# Patient Record
Sex: MALE | Race: WHITE | Employment: UNEMPLOYED | URBAN - METROPOLITAN AREA
[De-identification: names, ages, dates, MRNs, and addresses within clinical notes are randomized per-mention and may not be internally consistent; named-entity substitution may affect disease eponyms.]

---

## 2020-10-17 ENCOUNTER — HOSPITAL ENCOUNTER (EMERGENCY)
Age: 20
Discharge: HOME | End: 2020-10-18
Attending: EMERGENCY MEDICINE

## 2020-10-17 VITALS
WEIGHT: 220 LBS | HEIGHT: 74 IN | HEART RATE: 80 BPM | TEMPERATURE: 98 F | BODY MASS INDEX: 28.23 KG/M2 | SYSTOLIC BLOOD PRESSURE: 144 MMHG | RESPIRATION RATE: 20 BRPM | OXYGEN SATURATION: 99 % | DIASTOLIC BLOOD PRESSURE: 61 MMHG

## 2020-10-17 DIAGNOSIS — S20.212A CONTUSION OF LEFT CHEST WALL, INITIAL ENCOUNTER: Primary | ICD-10-CM

## 2020-10-17 PROCEDURE — 99283 EMERGENCY DEPT VISIT LOW MDM: CPT

## 2020-10-18 ENCOUNTER — APPOINTMENT (OUTPATIENT)
Dept: GENERAL RADIOLOGY | Age: 20
End: 2020-10-18
Attending: EMERGENCY MEDICINE

## 2020-10-18 PROCEDURE — 71101 X-RAY EXAM UNILAT RIBS/CHEST: CPT | Performed by: EMERGENCY MEDICINE
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
## 2025-01-14 ENCOUNTER — HOSPITAL ENCOUNTER (EMERGENCY)
Age: 25
Discharge: HOME | End: 2025-01-14
Attending: EMERGENCY MEDICINE

## 2025-01-14 VITALS
WEIGHT: 206 LBS | BODY MASS INDEX: 27.3 KG/M2 | HEIGHT: 73 IN | SYSTOLIC BLOOD PRESSURE: 149 MMHG | HEART RATE: 72 BPM | DIASTOLIC BLOOD PRESSURE: 93 MMHG | TEMPERATURE: 98 F | RESPIRATION RATE: 16 BRPM

## 2025-01-14 DIAGNOSIS — K02.9 PAIN DUE TO DENTAL CARIES: ICD-10-CM

## 2025-01-14 DIAGNOSIS — K04.7 DENTAL INFECTION: Primary | ICD-10-CM

## 2025-01-14 PROCEDURE — 99284 EMERGENCY DEPT VISIT MOD MDM: CPT

## 2025-01-14 PROCEDURE — 99283 EMERGENCY DEPT VISIT LOW MDM: CPT

## 2025-01-14 RX ORDER — IBUPROFEN 600 MG/1
600 TABLET, FILM COATED ORAL EVERY 8 HOURS PRN
Qty: 30 TABLET | Refills: 0 | Status: SHIPPED | OUTPATIENT
Start: 2025-01-14 | End: 2025-01-21

## 2025-01-14 RX ORDER — AMOXICILLIN 875 MG/1
875 TABLET, COATED ORAL 2 TIMES DAILY
Qty: 20 TABLET | Refills: 0 | Status: SHIPPED | OUTPATIENT
Start: 2025-01-14 | End: 2025-01-24

## 2025-01-14 NOTE — DISCHARGE INSTRUCTIONS
Likely dental cavity/infection with impacted wisdom tooth.  Antibiotics as prescribed.  Ibuprofen 600 mg every 6-8 hours with food.  Can take 2 extra Tylenol every 6-8 hours with food as well.  Very important to see his dentist as soon as you can.  Return to facial swelling or new complaints.

## 2025-01-14 NOTE — ED PROVIDER NOTES
Patient Seen in: [facility] In [place]      History     Chief Complaint   Patient presents with    Dental Problem     Stated Complaint: dental pain    Subjective:   HPI      Patient is a 24-year-old gentleman presents with left mandibular molar pain.  Has been off and on for quite some time.  More painful today.  Says he is both afraid and does not have money to see a dentist.  No facial swelling.  No difficulty swallowing.  No trismus or meningismus.  No other specific complaints.    Objective:     History reviewed. No pertinent past medical history.           History reviewed. No pertinent surgical history.             Social History     Socioeconomic History    Marital status: Single   Tobacco Use    Smoking status: Never     Passive exposure: Never    Smokeless tobacco: Never   Vaping Use    Vaping status: Never Used   Substance and Sexual Activity    Alcohol use: Yes     Comment: occ    Drug use: Yes     Types: Cannabis                  Physical Exam     ED Triage Vitals [01/14/25 0845]   BP (!) 149/93   Pulse 72   Resp 16   Temp 98.1 °F (36.7 °C)   Temp src Oral   SpO2    O2 Device        Current Vitals:   Vital Signs  BP: (!) 149/93  Pulse: 72  Resp: 16  Temp: 98.1 °F (36.7 °C)  Temp src: Oral        Physical Exam  General: Well-appearing patient of stated age resting comfortably  HEENT: Normocephalic atraumatic.  Nonicteric sclera.  Moist mucous membranes.  Left posterior mandible molar appears to have a cavity.  Also impacted.  Mild gum swelling.  Lungs: No tachypnea  Cardiac: No tachycardia  Skin: No rashes, pallor  Neuro: No focal deficits.  Extremities:    ED Course   Labs Reviewed - No data to display                MDM      Dental pain with likely cavity.  Also impacted wisdom tooth.  Needs to follow-up with a dentist as soon as he can.  Will treat with amoxicillin for 10 days.  Tylenol/ibuprofen.  Return if facial swelling, new complaints.        Medical Decision  Making      Disposition and Plan     Clinical Impression:  1. Dental infection    2. Pain due to dental caries         Disposition:  Discharge  1/14/2025  8:50 am    Follow-up:  dentist    Follow up in 2 day(s)            Medications Prescribed:  Current Discharge Medication List        START taking these medications    Details   amoxicillin 875 MG Oral Tab Take 1 tablet (875 mg total) by mouth 2 (two) times daily for 10 days.  Qty: 20 tablet, Refills: 0      ibuprofen 600 MG Oral Tab Take 1 tablet (600 mg total) by mouth every 8 (eight) hours as needed for Pain or Fever.  Qty: 30 tablet, Refills: 0                 Supplementary Documentation:

## 2025-04-05 NOTE — ED PROVIDER NOTES
Patient Seen in: [facility] In [place]      History     Chief Complaint   Patient presents with    Dental Problem     Stated Complaint: dental pain    Subjective:   25 yo-year-old male presents to the emergency department with complaint of dental pain.  Patient states he has been having left lower dental pain for a few months.  He was seen here in January for the same and put on antibiotics.  He states he finished the antibiotics, but has continued to have pain.  He states sometimes the pain radiates into his left ear.  Patient states he just started a new job and now has dental insurance.  He is going to try and get into see a dentist in the next week or so.  He denies any fever, chills, gum swelling, facial swelling, difficulty swallowing, or voice changes.    The history is provided by the patient.         Objective:     History reviewed. No pertinent past medical history.           History reviewed. No pertinent surgical history.             Social History     Socioeconomic History    Marital status: Single   Tobacco Use    Smoking status: Never     Passive exposure: Never    Smokeless tobacco: Never   Vaping Use    Vaping status: Never Used   Substance and Sexual Activity    Alcohol use: Yes     Comment: occ    Drug use: Yes     Types: Cannabis                  Physical Exam     ED Triage Vitals [04/05/25 1339]   BP (!) 140/93   Pulse 72   Resp 18   Temp 98.3 °F (36.8 °C)   Temp src Temporal   SpO2 100 %   O2 Device None (Room air)       Current Vitals:   Vital Signs  BP: (!) 140/93  Pulse: 72  Resp: 18  Temp: 98.3 °F (36.8 °C)  Temp src: Temporal    Oxygen Therapy  SpO2: 100 %  O2 Device: None (Room air)        Physical Exam  Vitals and nursing note reviewed.   Constitutional:       General: He is not in acute distress.     Appearance: Normal appearance. He is normal weight. He is not ill-appearing.   HENT:      Head: Normocephalic and atraumatic.      Right Ear: Tympanic membrane, ear  canal and external ear normal.      Left Ear: Tympanic membrane, ear canal and external ear normal.      Nose: Nose normal.      Mouth/Throat:      Mouth: Mucous membranes are moist.      Pharynx: Oropharynx is clear. Uvula midline.      Comments: Dental fracture and caries of tooth number 17.  No gingival edema or erythema.  Tongue and uvula are midline.  No trismus.  No facial edema.    Eyes:      Conjunctiva/sclera: Conjunctivae normal.      Pupils: Pupils are equal, round, and reactive to light.   Cardiovascular:      Rate and Rhythm: Normal rate and regular rhythm.      Pulses: Normal pulses.      Heart sounds: Normal heart sounds.   Pulmonary:      Effort: Pulmonary effort is normal. No respiratory distress.      Breath sounds: Normal breath sounds.   Musculoskeletal:         General: Normal range of motion.   Skin:     General: Skin is warm and dry.      Capillary Refill: Capillary refill takes less than 2 seconds.   Neurological:      General: No focal deficit present.      Mental Status: He is alert and oriented to person, place, and time.   Psychiatric:         Mood and Affect: Mood normal.         Behavior: Behavior normal.           ED Course   Labs Reviewed - No data to display            MDM        Medical Decision Making  24-year-old male with dentalgia and dental fracture.  Discussed with patient that I do not feel he needs antibiotics at this time.  Recommended Tylenol and Orajel to help with pain control.  Patient aware that he needs to follow-up with a dentist as soon as possible to have the tooth taken care of.  No evidence of sepsis, facial cellulitis, or dental abscess.    Risk  OTC drugs.        Disposition and Plan     Clinical Impression:  1. Closed fracture of tooth, initial encounter    2. Dentalgia         Disposition:  Discharge  4/5/2025  3:26 pm    Follow-up:  Izabel Pate DO  [address] [facility] [address]  [phone]    Follow up  As needed          Medications  Prescribed:  Discharge Medication List as of 4/5/2025  3:27 PM              Supplementary Documentation:

## 2025-04-05 NOTE — DISCHARGE INSTRUCTIONS
Rest and drink plenty of fluids.  Eat a soft diet.  Take Tylenol 1000 mg every 6 hours as needed for pain.  Start the antibiotics and make sure to finish the entire prescription as prescribed.  You can also try using topical Orajel to help with pain.  Follow-up with your dentist as soon as possible.

## 2025-04-05 NOTE — ED INITIAL ASSESSMENT (HPI)
Pt c/o left lower rear dental pain.  Pt also feels pressure in back of head and neck.  Pt denies fevers. Pt does need to get a tooth pulled in that same area,.

## 2025-04-17 NOTE — ED PROVIDER NOTES
Patient Seen in: [facility] In [place]      History     Chief Complaint   Patient presents with    Nausea/Vomiting/Diarrhea     Stated Complaint: vomiting since this morning    Subjective:   HPI    24-year-old male presents today with complaints of feeling dizzy and then had 6 episodes of nausea and vomiting.  Patient states that he was vomiting up brown liquid.  Patient denies any drug use.  Patient denies any chest pain or shortness of breath.  Patient denies any family history of cardiac events.  Patient denies any dietary changes that may have contributed to his symptoms.  Patient states he ate Captain Crunch before he vomited.  Patient denies any diarrhea.  History of Present Illness               Objective:     History reviewed. No pertinent past medical history.           History reviewed. No pertinent surgical history.             Social History     Socioeconomic History    Marital status: Single   Tobacco Use    Smoking status: Never     Passive exposure: Never    Smokeless tobacco: Never   Vaping Use    Vaping status: Never Used   Substance and Sexual Activity    Alcohol use: Yes     Comment: occ    Drug use: Yes     Types: Cannabis                                Physical Exam     ED Triage Vitals [04/17/25 1506]   /69   Pulse 82   Resp 18   Temp 97.7 °F (36.5 °C)   Temp src Temporal   SpO2 100 %   O2 Device None (Room air)       Current Vitals:   Vital Signs  BP: 136/84  Pulse: 76  Resp: 18  Temp: 97.7 °F (36.5 °C)  Temp src: Temporal    Oxygen Therapy  SpO2: 99 %  O2 Device: None (Room air)        Physical Exam  Vitals and nursing note reviewed.   Constitutional:       Appearance: He is well-developed and normal weight.      Comments: Alert nontoxic 24-year-old male sitting in exam table speaking in full sentences in no respiratory distress.   HENT:      Head: Normocephalic and atraumatic.      Mouth/Throat:      Mouth: Mucous membranes are moist.      Pharynx: Oropharynx is  clear.   Eyes:      Extraocular Movements: Extraocular movements intact.      Pupils: Pupils are equal, round, and reactive to light.   Cardiovascular:      Rate and Rhythm: Normal rate and regular rhythm.      Heart sounds: Normal heart sounds.   Pulmonary:      Effort: Pulmonary effort is normal.      Breath sounds: Normal breath sounds.   Abdominal:      General: Abdomen is flat. Bowel sounds are normal.      Palpations: Abdomen is soft.      Tenderness: There is no abdominal tenderness.   Neurological:      Mental Status: He is alert.   Psychiatric:         Mood and Affect: Mood normal.         Physical Exam                ED Course     Labs Reviewed   COMP METABOLIC PANEL (14) - Abnormal; Notable for the following components:       Result Value    Total Protein 8.6 (*)     Albumin 5.0 (*)     Globulin  3.6 (*)     All other components within normal limits   URINALYSIS WITH CULTURE REFLEX - Abnormal; Notable for the following components:    Ketones Urine 80.0 (*)     All other components within normal limits   LIPASE - Normal   TROPONIN I HIGH SENSITIVITY - Normal   RAPID SARS-COV-2 BY PCR - Normal   POCT FLU TEST - Normal    Narrative:     This assay is a rapid molecular in vitro test utilizing nucleic acid amplification of influenza A and B viral RNA.   CBC WITH DIFFERENTIAL WITH PLATELET   RAINBOW DRAW BLUE     EKG    Rate, intervals and axes as noted on EKG Report.  Rate: 71  Rhythm: Sinus Rhythm  Reading: NSR              Results                                 MDM        24-year-old male presents today with complaints of feeling dizzy and then had 6 episodes of nausea and vomiting.  Patient states that he was vomiting up brown liquid.  Patient denies any drug use.  Patient denies any chest pain or shortness of breath.  Patient denies any family history of cardiac events.  Patient denies any dietary changes that may have contributed to his symptoms.  Patient states he ate Captain Crunch before he vomited.   Patient denies any diarrhea.  Vital signs: Please see EMR.  Physical exam: Please see exam.  Differential diagnosis: Viral gastroenteritis, COVID, flu, cardiac event, cholecystitis, gastritis.    Heart Score:    HEART Score      Title      Criteria Score   Age: <45 Age Score: 0   History: Slightly Suspicious Hx Score: 0     EKG: Normal EKG Score: 0   HTN: No   Hypercholesterolemia: No   Atherosclerosis/PVD: No     DM: No   BMI>30kg/m2: No   Smoking: No   Family History: No         Other Risk Factor Score: 0             Lab Results   Component Value Date    TROPHS 3 04/17/2025           HEART Score: 0        Risk of adverse cardiac event is 0.9-1.7%          Recent Results (from the past 24 hours)   EKG    Collection Time: 04/17/25  3:32 PM   Result Value Ref Range    Ventricular rate 71 BPM    Atrial rate 71 BPM    P-R Interval 140 ms    QRS Duration 84 ms    Q-T Interval 356 ms    QTC Calculation (Bezet) 386 ms    P Axis 34 degrees    R Axis 44 degrees    T Axis 40 degrees   Rapid SARS-CoV-2 by PCR    Collection Time: 04/17/25  3:35 PM    Specimen: Nares; Other   Result Value Ref Range    Rapid SARS-CoV-2 by PCR Not Detected Not Detected   POCT Flu Test    Collection Time: 04/17/25  3:35 PM    Specimen: Nares; Other   Result Value Ref Range    POCT INFLUENZA A Negative Negative    POCT INFLUENZA B Negative Negative   CBC With Differential With Platelet    Collection Time: 04/17/25  3:45 PM   Result Value Ref Range    WBC 7.1 4.0 - 11.0 x10(3) uL    RBC 5.42 4.30 - 5.70 x10(6)uL    HGB 15.7 13.0 - 17.5 g/dL    HCT 45.6 39.0 - 53.0 %    .0 150.0 - 450.0 10(3)uL    MCV 84.1 80.0 - 100.0 fL    MCH 29.0 26.0 - 34.0 pg    MCHC 34.4 31.0 - 37.0 g/dL    RDW 13.4 %    Neutrophil Absolute Prelim 5.09 1.50 - 7.70 x10 (3) uL    Neutrophil Absolute 5.09 1.50 - 7.70 x10(3) uL    Lymphocyte Absolute 1.27 1.00 - 4.00 x10(3) uL    Monocyte Absolute 0.69 0.10 - 1.00 x10(3) uL    Eosinophil Absolute 0.04 0.00 - 0.70 x10(3) uL     Basophil Absolute 0.03 0.00 - 0.20 x10(3) uL    Immature Granulocyte Absolute 0.02 0.00 - 1.00 x10(3) uL    Neutrophil % 71.2 %    Lymphocyte % 17.8 %    Monocyte % 9.7 %    Eosinophil % 0.6 %    Basophil % 0.4 %    Immature Granulocyte % 0.3 %   Comp Metabolic Panel (14)    Collection Time: 04/17/25  3:45 PM   Result Value Ref Range    Glucose 90 70 - 99 mg/dL    Sodium 137 136 - 145 mmol/L    Potassium 3.9 3.5 - 5.1 mmol/L    Chloride 103 98 - 112 mmol/L    CO2 27.0 21.0 - 32.0 mmol/L    Anion Gap 7 0 - 18 mmol/L    BUN 13 9 - 23 mg/dL    Creatinine 0.98 0.70 - 1.30 mg/dL    Calcium, Total 10.2 8.7 - 10.6 mg/dL    Calculated Osmolality 284 275 - 295 mOsm/kg    eGFR-Cr 110 >=60 mL/min/1.73m2    AST 25 <34 U/L    ALT 24 10 - 49 U/L    Alkaline Phosphatase 95 45 - 117 U/L    Bilirubin, Total 0.6 0.3 - 1.2 mg/dL    Total Protein 8.6 (H) 5.7 - 8.2 g/dL    Albumin 5.0 (H) 3.2 - 4.8 g/dL    Globulin  3.6 (H) 2.0 - 3.5 g/dL    A/G Ratio 1.4 1.0 - 2.0   Lipase    Collection Time: 04/17/25  3:45 PM   Result Value Ref Range    Lipase 30 12 - 53 U/L   Troponin I (High Sensitivity)    Collection Time: 04/17/25  3:45 PM   Result Value Ref Range    Troponin I (High Sensitivity) 3 <=53 ng/L   RAINBOW DRAW BLUE    Collection Time: 04/17/25  3:45 PM   Result Value Ref Range    Hold Blue Auto Resulted    Urinalysis with Culture Reflex    Collection Time: 04/17/25  5:49 PM    Specimen: Urine, clean catch   Result Value Ref Range    Urine Color Yellow Yellow    Clarity Urine Clear Clear    Spec Gravity 1.010 1.005 - 1.030    Glucose Urine Negative Negative mg/dL    Bilirubin Urine Negative Negative    Ketones Urine 80.0 (A) Negative mg/dL    Blood Urine Negative Negative    pH Urine 6.0 5.0 - 8.0    Protein Urine Negative Negative mg/dL    Urobilinogen Urine 0.2 <2.0 mg/dL    Nitrite Urine Negative Negative    Leukocyte Esterase Urine Negative Negative    Microscopic Microscopic not indicated      CT ABDOMEN+PELVIS(CONTRAST  ONLY)(CPT=74177)  Result Date: 4/17/2025  CONCLUSION:   1. Findings concerning for cystitis.  Clinical correlation recommended.  2. Mild gastric wall thickening may be related to incomplete distension, with nonspecific gastritis not excluded.  Clinical correlation recommended.    Please see above for further details.  LOCATION:  [facility]   Dictated by (CST): Wilson Dawkins MD on 4/17/2025 at 5:11 PM     Finalized by (CST): Wilson Dawkins MD on 4/17/2025 at 5:14 PM       Based on physical exam and HPI will diagnosed with gastritis.  Will prescribe Zofran as needed.  Patient instructed to increase his water intake.  ED precautions given.        Medical Decision Making  24-year-old male presents today with complaints of feeling dizzy and then had 6 episodes of nausea and vomiting.  Patient states that he was vomiting up brown liquid.  Patient denies any drug use.  Patient denies any chest pain or shortness of breath.  Patient denies any family history of cardiac events.  Patient denies any dietary changes that may have contributed to his symptoms.  Patient states he ate Captain Crunch before he vomited.  Patient denies any diarrhea.    Problems Addressed:  Acute gastritis without hemorrhage, unspecified gastritis type: acute illness or injury    Amount and/or Complexity of Data Reviewed  Labs: ordered. Decision-making details documented in ED Course.     Details: Recent Results (from the past 24 hours)  -EKG:   Collection Time: 04/17/25  3:32 PM       Result                      Value             Ref Range           Ventricular rate            71                BPM                 Atrial rate                 71                BPM                 P-R Interval                140               ms                  QRS Duration                84                ms                  Q-T Interval                356               ms                  QTC Calculation (Bezet)     386               ms                  P Axis                       34                degrees             R Axis                      44                degrees             T Axis                      40                degrees        -Rapid SARS-CoV-2 by PCR:   Collection Time: 04/17/25  3:35 PM  Specimen: Nares; Other       Result                      Value             Ref Range           Rapid SARS-CoV-2 by PCR     Not Detected      Not Detected   -POCT Flu Test:   Collection Time: 04/17/25  3:35 PM  Specimen: Nares; Other       Result                      Value             Ref Range           POCT INFLUENZA A            Negative          Negative            POCT INFLUENZA B            Negative          Negative       -CBC With Differential With Platelet:   Collection Time: 04/17/25  3:45 PM       Result                      Value             Ref Range           WBC                         7.1               4.0 - 11.0 x*       RBC                         5.42              4.30 - 5.70 *       HGB                         15.7              13.0 - 17.5 *       HCT                         45.6              39.0 - 53.0 %       PLT                         238.0             150.0 - 450.*       MCV                         84.1              80.0 - 100.0*       MCH                         29.0              26.0 - 34.0 *       MCHC                        34.4              31.0 - 37.0 *       RDW                         13.4              %                   Neutrophil Absolute Pr*     5.09              1.50 - 7.70 *       Neutrophil Absolute         5.09              1.50 - 7.70 *       Lymphocyte Absolute         1.27              1.00 - 4.00 *       Monocyte Absolute           0.69              0.10 - 1.00 *       Eosinophil Absolute         0.04              0.00 - 0.70 *       Basophil Absolute           0.03              0.00 - 0.20 *       Immature Granulocyte A*     0.02              0.00 - 1.00 *       Neutrophil %                71.2              %                    Lymphocyte %                17.8              %                   Monocyte %                  9.7               %                   Eosinophil %                0.6               %                   Basophil %                  0.4               %                   Immature Granulocyte %      0.3               %              -Comp Metabolic Panel (14):   Collection Time: 04/17/25  3:45 PM       Result                      Value             Ref Range           Glucose                     90                70 - 99 mg/dL       Sodium                      137               136 - 145 mm*       Potassium                   3.9               3.5 - 5.1 mm*       Chloride                    103               98 - 112 mmo*       CO2                         27.0              21.0 - 32.0 *       Anion Gap                   7                 0 - 18 mmol/L       BUN                         13                9 - 23 mg/dL        Creatinine                  0.98              0.70 - 1.30 *       Calcium, Total              10.2              8.7 - 10.6 m*       Calculated Osmolality       284               275 - 295 mO*       eGFR-Cr                     110               >=60 mL/min/*       AST                         25                <34 U/L             ALT                         24                10 - 49 U/L         Alkaline Phosphatase        95                45 - 117 U/L        Bilirubin, Total            0.6               0.3 - 1.2 mg*       Total Protein               8.6 (H)           5.7 - 8.2 g/*       Albumin                     5.0 (H)           3.2 - 4.8 g/*       Globulin                    3.6 (H)           2.0 - 3.5 g/*       A/G Ratio                   1.4               1.0 - 2.0      -Lipase:   Collection Time: 04/17/25  3:45 PM       Result                      Value             Ref Range           Lipase                      30                12 - 53 U/L    -Troponin I (High Sensitivity):   Collection Time: 04/17/25   3:45 PM       Result                      Value             Ref Range           Troponin I (High Sensi*     3                 <=53 ng/L      -RAINBOW DRAW BLUE:   Collection Time: 04/17/25  3:45 PM       Result                      Value             Ref Range           Hold Blue                   Auto Resulted                    -Urinalysis with Culture Reflex:   Collection Time: 04/17/25  5:49 PM  Specimen: Urine, clean catch       Result                      Value             Ref Range           Urine Color                 Yellow            Yellow              Clarity Urine               Clear             Clear               Spec Gravity                1.010             1.005 - 1.030       Glucose Urine               Negative          Negative mg/*       Bilirubin Urine             Negative          Negative            Ketones Urine               80.0 (A)          Negative mg/*       Blood Urine                 Negative          Negative            pH Urine                    6.0               5.0 - 8.0           Protein Urine               Negative          Negative mg/*       Urobilinogen Urine          0.2               <2.0 mg/dL          Nitrite Urine               Negative          Negative            Leukocyte Esterase Uri*     Negative          Negative            Microscopic                                                   Microscopic not indicated    Radiology: ordered. Decision-making details documented in ED Course.     Details: CT ABDOMEN+PELVIS(CONTRAST ONLY)(CPT=74177)  Result Date: 4/17/2025  CONCLUSION:   1. Findings concerning for cystitis.  Clinical correlation recommended.  2. Mild gastric wall thickening may be related to incomplete distension, with nonspecific gastritis not excluded.  Clinical correlation recommended.    Please see above for further details.  LOCATION:  [facility]   Dictated by (CST): Wilson Dawkins MD on 4/17/2025 at 5:11 PM     Finalized by (CST): Wilson Dawkins MD on  4/17/2025 at 5:14 PM         ECG/medicine tests: ordered. Decision-making details documented in ED Course.    Risk  Prescription drug management.        Disposition and Plan     Clinical Impression:  1. Acute gastritis without hemorrhage, unspecified gastritis type         Disposition:  There is no disposition on file for this visit.  There is no disposition time on file for this visit.    Follow-up:  Ambika Sanderson, DO  [address] [facility] [address]  [facility] [id number] [phone]    Schedule an appointment as soon as possible for a visit            Medications Prescribed:  Current Discharge Medication List        START taking these medications    Details   ondansetron 4 MG Oral Tablet Dispersible Take 1 tablet (4 mg total) by mouth every 4 (four) hours as needed for Nausea.  Qty: 10 tablet, Refills: 0             Supplementary Documentation:

## (undated) NOTE — LETTER
Date & Time: 4/17/2025, 6:40 PM  Patient: Cullen Lassiter  Encounter Provider(s):    Mj Manzanares MD Kostner, LISANDRO Mayers       To Whom It May Concern:    Cullen Lassiter was seen and treated in our department on 4/17/2025. He can return to work 4/19/25.    If you have any questions or concerns, please do not hesitate to call.        _____________________________  Physician/APC Signature